# Patient Record
Sex: FEMALE | URBAN - METROPOLITAN AREA
[De-identification: names, ages, dates, MRNs, and addresses within clinical notes are randomized per-mention and may not be internally consistent; named-entity substitution may affect disease eponyms.]

---

## 2021-11-05 ENCOUNTER — OFFICE VISIT (OUTPATIENT)
Dept: URBAN - METROPOLITAN AREA CLINIC 43 | Facility: CLINIC | Age: 26
End: 2021-11-05
Payer: COMMERCIAL

## 2021-11-05 DIAGNOSIS — H57.02 ANISOCORIA: Primary | ICD-10-CM

## 2021-11-05 DIAGNOSIS — H40.013 OPEN ANGLE WITH BORDERLINE FINDINGS, LOW RISK, BILATERAL: ICD-10-CM

## 2021-11-05 PROCEDURE — 99204 OFFICE O/P NEW MOD 45 MIN: CPT | Performed by: OPTOMETRIST

## 2021-11-05 ASSESSMENT — VISUAL ACUITY
OS: 20/20
OD: 20/20

## 2021-11-05 ASSESSMENT — INTRAOCULAR PRESSURE
OD: 15
OS: 16

## 2021-11-05 ASSESSMENT — KERATOMETRY
OD: 41.00
OS: 40.88
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
## 2021-11-05 NOTE — IMPRESSION/PLAN
Impression: Open angle with borderline findings, low risk, bilateral: H40.013.  Plan: 2/2 cupping OS>OD
IOP avg
return 3-4 weeks for HVF/IOP/pachs/OCT RNFL

## 2021-12-02 ENCOUNTER — OFFICE VISIT (OUTPATIENT)
Dept: URBAN - METROPOLITAN AREA CLINIC 43 | Facility: CLINIC | Age: 26
End: 2021-12-02
Payer: COMMERCIAL

## 2021-12-02 PROCEDURE — 76514 ECHO EXAM OF EYE THICKNESS: CPT | Performed by: OPTOMETRIST

## 2021-12-02 PROCEDURE — 92133 CPTRZD OPH DX IMG PST SGM ON: CPT | Performed by: OPTOMETRIST

## 2021-12-02 PROCEDURE — 92083 EXTENDED VISUAL FIELD XM: CPT | Performed by: OPTOMETRIST

## 2021-12-02 PROCEDURE — 99213 OFFICE O/P EST LOW 20 MIN: CPT | Performed by: OPTOMETRIST

## 2021-12-02 ASSESSMENT — INTRAOCULAR PRESSURE
OD: 15
OS: 15

## 2021-12-02 NOTE — IMPRESSION/PLAN
Impression: Open angle with borderline findings, low risk, bilateral: H40.013. grandfather blind from [facility] Plan: 2/2 cupping OS>OD
IOP avg
pachs thick to avg
OCT RNFL today: no thinning OU HVF today: reliable OU, OD: scattered defects, OS: nasal step defects HVF and oCT RNFL do not match, cont without gtts HVF/IOP in 6 months

## 2022-06-02 ENCOUNTER — OFFICE VISIT (OUTPATIENT)
Dept: URBAN - METROPOLITAN AREA CLINIC 43 | Facility: CLINIC | Age: 27
End: 2022-06-02
Payer: COMMERCIAL

## 2022-06-02 DIAGNOSIS — H40.013 OPEN ANGLE WITH BORDERLINE FINDINGS, LOW RISK, BILATERAL: Primary | ICD-10-CM

## 2022-06-02 PROCEDURE — 92083 EXTENDED VISUAL FIELD XM: CPT | Performed by: OPTOMETRIST

## 2022-06-02 PROCEDURE — 99213 OFFICE O/P EST LOW 20 MIN: CPT | Performed by: OPTOMETRIST

## 2022-06-02 ASSESSMENT — INTRAOCULAR PRESSURE
OS: 15
OD: 15

## 2022-06-02 NOTE — IMPRESSION/PLAN
Impression: Open angle with borderline findings, low risk, bilateral: H40.013. grandfather blind from [address] [place] [address]: 2/2 cupping OS>OD
IOP avg
pachs thick to avg
OCT RNFL 12/02/21: no thinning OU HVF today: reliable OU, OD: few defects, otherwise cleare, OS: nasal step defects (non repeatable)
cont without gtts
return 6 months for CE/OCT RNFL

## 2024-06-27 ENCOUNTER — OFFICE VISIT (OUTPATIENT)
Dept: URBAN - METROPOLITAN AREA CLINIC 43 | Facility: CLINIC | Age: 29
End: 2024-06-27
Payer: MEDICAID

## 2024-06-27 DIAGNOSIS — H04.123 DRY EYE SYNDROME OF BILATERAL LACRIMAL GLANDS: ICD-10-CM

## 2024-06-27 DIAGNOSIS — H40.013 OPEN ANGLE WITH BORDERLINE FINDINGS, LOW RISK, BILATERAL: Primary | ICD-10-CM

## 2024-06-27 DIAGNOSIS — H57.02 ANISOCORIA: ICD-10-CM

## 2024-06-27 PROCEDURE — 92133 CPTRZD OPH DX IMG PST SGM ON: CPT | Performed by: OPTOMETRIST

## 2024-06-27 PROCEDURE — 92014 COMPRE OPH EXAM EST PT 1/>: CPT | Performed by: OPTOMETRIST

## 2024-06-27 ASSESSMENT — INTRAOCULAR PRESSURE
OS: 16
OD: 16

## 2024-06-27 ASSESSMENT — KERATOMETRY
OS: 41.00
OD: 41.00

## 2024-07-18 ENCOUNTER — OFFICE VISIT (OUTPATIENT)
Dept: URBAN - METROPOLITAN AREA CLINIC 43 | Facility: CLINIC | Age: 29
End: 2024-07-18
Payer: MEDICAID

## 2024-07-18 DIAGNOSIS — H57.02 ANISOCORIA: ICD-10-CM

## 2024-07-18 DIAGNOSIS — H04.123 DRY EYE SYNDROME OF BILATERAL LACRIMAL GLANDS: Primary | ICD-10-CM

## 2024-07-18 PROCEDURE — 92012 INTRM OPH EXAM EST PATIENT: CPT | Performed by: OPTOMETRIST

## 2024-07-18 ASSESSMENT — INTRAOCULAR PRESSURE
OD: 16
OS: 17